# Patient Record
Sex: FEMALE | Race: OTHER | HISPANIC OR LATINO
[De-identification: names, ages, dates, MRNs, and addresses within clinical notes are randomized per-mention and may not be internally consistent; named-entity substitution may affect disease eponyms.]

---

## 2023-01-12 ENCOUNTER — APPOINTMENT (OUTPATIENT)
Dept: CARDIOLOGY | Facility: CLINIC | Age: 68
End: 2023-01-12

## 2023-07-11 ENCOUNTER — DOCTOR'S OFFICE (OUTPATIENT)
Dept: URBAN - METROPOLITAN AREA CLINIC 157 | Facility: CLINIC | Age: 68
Setting detail: OPHTHALMOLOGY
End: 2023-07-11

## 2023-07-11 DIAGNOSIS — H04.123: ICD-10-CM

## 2023-07-11 DIAGNOSIS — H25.13: ICD-10-CM

## 2023-07-11 DIAGNOSIS — H43.393: ICD-10-CM

## 2023-07-11 PROCEDURE — 92004 COMPRE OPH EXAM NEW PT 1/>: CPT | Performed by: OPHTHALMOLOGY

## 2023-07-11 ASSESSMENT — REFRACTION_MANIFEST
OD_AXIS: 015
OD_VA1: 20/20
OS_CYLINDER: +1.00
OD_SPHERE: -1.75
OS_VA1: 20/20
OS_SPHERE: -2.00
OD_CYLINDER: +1.00
OS_AXIS: 010

## 2023-07-11 ASSESSMENT — SPHEQUIV_DERIVED
OD_SPHEQUIV: -1.25
OS_SPHEQUIV: -1.25
OD_SPHEQUIV: -1.375
OS_SPHEQUIV: -1.5

## 2023-07-11 ASSESSMENT — TONOMETRY
OD_IOP_MMHG: 17
OS_IOP_MMHG: 16
OS_IOP_MMHG: 16
OD_IOP_MMHG: 16

## 2023-07-11 ASSESSMENT — REFRACTION_CURRENTRX
OD_SPHERE: -2.00
OS_SPHERE: -2.00
OD_OVR_VA: 20/
OD_ADD: +2.50
OS_AXIS: 015
OS_CYLINDER: +1.00
OD_AXIS: 001
OS_ADD: +2.50
OS_OVR_VA: 20/
OD_CYLINDER: +1.00

## 2023-07-11 ASSESSMENT — KERATOMETRY
OD_AXISANGLE_DEGREES: 005
OS_AXISANGLE_DEGREES: 010
OS_K1POWER_DIOPTERS: 44.25
OS_K2POWER_DIOPTERS: 44.75
OD_K2POWER_DIOPTERS: 44.75
OD_K1POWER_DIOPTERS: 44.25

## 2023-07-11 ASSESSMENT — REFRACTION_AUTOREFRACTION
OD_SPHERE: -2.00
OS_SPHERE: -2.00
OS_CYLINDER: +1.50
OD_CYLINDER: +1.25
OD_AXIS: 005
OS_AXIS: 010

## 2023-07-11 ASSESSMENT — CONFRONTATIONAL VISUAL FIELD TEST (CVF)
OS_FINDINGS: FULL
OD_FINDINGS: FULL

## 2023-07-11 ASSESSMENT — VISUAL ACUITY
OD_BCVA: 20/20-2
OS_BCVA: 20/25+2

## 2023-07-11 ASSESSMENT — AXIALLENGTH_DERIVED
OD_AL: 23.7615
OD_AL: 23.7122
OS_AL: 23.811
OS_AL: 23.7122

## 2023-07-12 PROBLEM — H43.393 VITREOUS OPACITIES, OTHER; BOTH EYES: Status: ACTIVE | Noted: 2023-07-11

## 2023-07-12 PROBLEM — H04.123 DRY EYE SYNDROME LACRIMAL GLAND; BOTH EYES: Status: ACTIVE | Noted: 2023-07-11

## 2024-03-06 ENCOUNTER — APPOINTMENT (OUTPATIENT)
Dept: GYNECOLOGIC ONCOLOGY | Facility: CLINIC | Age: 69
End: 2024-03-06
Payer: MEDICARE

## 2024-03-06 VITALS — DIASTOLIC BLOOD PRESSURE: 80 MMHG | HEIGHT: 66 IN | SYSTOLIC BLOOD PRESSURE: 152 MMHG

## 2024-03-06 VITALS — WEIGHT: 194 LBS | BODY MASS INDEX: 31.31 KG/M2

## 2024-03-06 DIAGNOSIS — R19.00 INTRA-ABDOMINAL AND PELVIC SWELLING, MASS AND LUMP, UNSPECIFIED SITE: ICD-10-CM

## 2024-03-06 PROCEDURE — 99205 OFFICE O/P NEW HI 60 MIN: CPT | Mod: 57

## 2024-03-07 NOTE — HISTORY OF PRESENT ILLNESS
[FreeTextEntry1] : 69 y/o P4 referred for evaluation of pelvic mass.  Patient is largely asymptomatic, has a history of a pelvic mass that she has been aware of for about 8 years. Since 12/2023 she has been having lower abdominal pain and urinary urgency. The pain is intermittent and stabing and resolves spontaneously. 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 MRIs from [facility] and [facility] were reviewed remotely.

## 2024-03-07 NOTE — ASSESSMENT
[FreeTextEntry1] : MRI 2024: mass is 9.8x8.4x16.9  MRI 2019 mass is 12.8x7.7x10  No adenopathy or ascites in either scan  Ca125 was 11 in 2019 and 48 this year.  All films scanned in and MRIs were reviewed remotely.

## 2024-03-07 NOTE — DISCUSSION/SUMMARY
[Reviewed Clinical Lab Test(s)] : Results of clinical tests were reviewed. [Reviewed Radiology Report(s)] : Radiology reports were reviewed. [Visit Time ___ Minutes] : [unfilled] minutes [Reviewed Radiology Film/Image(s)] : Images from radiology studies were reviewed and examined. [Face to Face Time___ Minutes] : with [unfilled] minutes in face to face consultation. [FreeTextEntry1] : Was able to review both MRIs side by side and share the images with the patient. There has been a clear cut increase in the size of the mass which raises the possibility of malignancy. As she has had hysterectomy, the mass is most likely ovarian, and she states that a sister was Dx with ovarian cancer in the past. Recommend Ex lap BSO/mass excision and possible staging. R/B/A of this procedure discussed, no role for continued observation at this time as the mass has already demonstrated increase in size. Risk of bleeding, infection and injury to either the urinary or GI tract discussed, may require colon resection if the mass is involving the colon. Multiple questions answered, concerns addressed.  She will see PCP to optimize her BPs for surgery. To schedule in the next 2-3 weeks

## 2024-03-07 NOTE — PHYSICAL EXAM
[Normal] : Vagina: Normal [Absent] : Uterus: Absent [Abnormal] : Adnexa(ae): Abnormal [FreeTextEntry1] : NAD [de-identified] : soft NT/ND [de-identified] : no lesions [de-identified] : no lesions [de-identified] : mass compressing the colon. [de-identified] : firm mass extending to the upper abdomen - rock hard and tender. [Fully active, able to carry on all pre-disease performance without restriction] : Status 0 - Fully active, able to carry on all pre-disease performance without restriction

## 2024-03-07 NOTE — REVIEW OF SYSTEMS
[Negative] : Gastrointestinal [Rash] : no rash noted [Ulcer] : no ulcer was seen [Syncope] : no syncope noted [Neuropathy] : no neuropathy [Diabetes] : no diabetes mellitus [Depression] : no depression [Hematuria] : no hematuria [Dysuria] : no dysuria [Abn Vag Bleeding] : no abnormal vaginal bleeding [Incontinence] : no incontinence [Normal Sexual Function] : abnormal sexual function [Fatigue] : no fatigue [Recent Wt Gain ___ Lbs] : no recent weight gain [Recent Wt Loss___ Lbs] : no recent weight loss [Chest Pain] : no chest pain [BAKER] : no dyspnea on exertion [Wheezing] : no wheezing [Bloody Stools] : no bloody stools [SOB on Exertion] : no shortness of breath during exertion [Nausea] : no nausea/vomitting [Muscle Weakness] : no muscle weakness

## 2024-03-18 PROBLEM — Z78.9 NON-SMOKER: Status: ACTIVE | Noted: 2024-03-18

## 2024-03-18 NOTE — OB HISTORY
[Total Preg ___] : : [unfilled] [Living ___] : [unfilled] (living) [Vaginal ___] : [unfilled] vaginal delivery(s) [AB Spont ___] : [unfilled] miscarriage(s)

## 2024-03-18 NOTE — HISTORY OF PRESENT ILLNESS
[FreeTextEntry1] : 68 year old ,  x 2, LMP 40 years ago with c/o pelvic mass. History of adrenal mass.  MRI abdomen and pelvis with and without contrast 24 Findings:  Uterus: Post hysterectomy. Abutting the vaginal cuff, measuring 9.8 x 8.4 x 16.9 cm heterogeneous T2 dark mass demonstrating heterogeneous enhancement.  The enhancing areas demonstrate diffusion restriction. Right ovary: Probable atrophic small ovary measuring 1.4 x 1 cm Left ovary: Normal in size and appearance measuring 2 x 2.5 cm  Impression: 1. Large pelvic mass with heterogeneous enhancement, malignant neoplasm cannot be excluded, such as leiomyosarcoma.  2. No definitive adnexal mass 3. No suspicious lymphadenopathy.   Presents today for initial evaluation.

## 2024-03-22 ENCOUNTER — APPOINTMENT (OUTPATIENT)
Dept: GYNECOLOGIC ONCOLOGY | Facility: CLINIC | Age: 69
End: 2024-03-22

## 2024-03-22 DIAGNOSIS — Z78.9 OTHER SPECIFIED HEALTH STATUS: ICD-10-CM

## 2025-04-17 ENCOUNTER — DOCTOR'S OFFICE (OUTPATIENT)
Dept: URBAN - METROPOLITAN AREA CLINIC 163 | Facility: CLINIC | Age: 70
Setting detail: OPHTHALMOLOGY
End: 2025-04-17
Payer: MEDICARE

## 2025-04-17 ENCOUNTER — RX ONLY (RX ONLY)
Age: 70
End: 2025-04-17

## 2025-04-17 DIAGNOSIS — H52.4: ICD-10-CM

## 2025-04-17 DIAGNOSIS — H04.123: ICD-10-CM

## 2025-04-17 DIAGNOSIS — H18.422: ICD-10-CM

## 2025-04-17 DIAGNOSIS — H25.13: ICD-10-CM

## 2025-04-17 DIAGNOSIS — H43.393: ICD-10-CM

## 2025-04-17 PROBLEM — H43.813 PVD; BOTH EYES: Status: ACTIVE | Noted: 2025-04-17

## 2025-04-17 PROCEDURE — 92015 DETERMINE REFRACTIVE STATE: CPT | Performed by: OPHTHALMOLOGY

## 2025-04-17 PROCEDURE — 68761 CLOSE TEAR DUCT OPENING: CPT | Mod: 50 | Performed by: OPHTHALMOLOGY

## 2025-04-17 PROCEDURE — 92014 COMPRE OPH EXAM EST PT 1/>: CPT | Mod: 25 | Performed by: OPHTHALMOLOGY

## 2025-04-17 ASSESSMENT — REFRACTION_MANIFEST
OD_ADD: +2.50
OS_AXIS: 010
OD_SPHERE: -1.75
OS_SPHERE: -2.00
OD_VA1: 20/20
OS_VA1: 20/25
OS_ADD: +2.50
OD_AXIS: 005
OD_SPHERE: -1.50
OS_CYLINDER: +1.00
OD_CYLINDER: +1.50
OU_VA: 20/25+2
OD_AXIS: 015
OS_SPHERE: -2.00
OS_VA1: 20/20
OD_VA1: 20/25
OD_CYLINDER: +1.00
OS_AXIS: 015
OS_CYLINDER: +1.50

## 2025-04-17 ASSESSMENT — CORNEAL DYSTROPHY - BAND KERATOPATHY: OS_BANDKERATOPATHY: VISUAL AXIS

## 2025-04-17 ASSESSMENT — REFRACTION_CURRENTRX
OS_ADD: +2.50
OD_OVR_VA: 20/
OD_ADD: +2.50
OS_OVR_VA: 20/
OS_OVR_VA: 20/
OD_SPHERE: -2.00
OD_AXIS: 001
OS_SPHERE: -2.25
OD_AXIS: 173
OD_CYLINDER: +1.00
OS_SPHERE: -2.00
OS_CYLINDER: +1.00
OS_ADD: +2.50
OS_AXIS: 015
OD_CYLINDER: +1.00
OD_OVR_VA: 20/
OS_CYLINDER: +1.00
OS_AXIS: 20
OD_ADD: +2.50
OD_SPHERE: -2.00

## 2025-04-17 ASSESSMENT — REFRACTION_AUTOREFRACTION
OS_SPHERE: -1.50
OD_CYLINDER: +1.50
OS_AXIS: 177
OD_SPHERE: -1.25
OS_CYLINDER: +1.75
OD_AXIS: 005

## 2025-04-17 ASSESSMENT — CONFRONTATIONAL VISUAL FIELD TEST (CVF)
OS_FINDINGS: FULL
OD_FINDINGS: FULL

## 2025-04-17 ASSESSMENT — KERATOMETRY
OS_K2POWER_DIOPTERS: 44.75
OD_K2POWER_DIOPTERS: 44.75
OD_K1POWER_DIOPTERS: 44.25
OD_AXISANGLE_DEGREES: 005
OS_AXISANGLE_DEGREES: 010
OS_K1POWER_DIOPTERS: 44.25

## 2025-04-17 ASSESSMENT — VISUAL ACUITY
OD_BCVA: 20/25-
OS_BCVA: 20/25-2